# Patient Record
Sex: FEMALE | Race: WHITE | NOT HISPANIC OR LATINO | URBAN - METROPOLITAN AREA
[De-identification: names, ages, dates, MRNs, and addresses within clinical notes are randomized per-mention and may not be internally consistent; named-entity substitution may affect disease eponyms.]

---

## 2017-01-25 ENCOUNTER — OUTPATIENT (OUTPATIENT)
Dept: OUTPATIENT SERVICES | Facility: HOSPITAL | Age: 77
LOS: 1 days | End: 2017-01-25
Payer: MEDICARE

## 2017-01-25 VITALS
TEMPERATURE: 97 F | WEIGHT: 142.2 LBS | HEART RATE: 69 BPM | RESPIRATION RATE: 16 BRPM | SYSTOLIC BLOOD PRESSURE: 171 MMHG | HEIGHT: 62 IN | DIASTOLIC BLOOD PRESSURE: 91 MMHG

## 2017-01-25 DIAGNOSIS — Z01.818 ENCOUNTER FOR OTHER PREPROCEDURAL EXAMINATION: ICD-10-CM

## 2017-01-25 DIAGNOSIS — Z98.49 CATARACT EXTRACTION STATUS, UNSPECIFIED EYE: Chronic | ICD-10-CM

## 2017-01-25 DIAGNOSIS — Z98.890 OTHER SPECIFIED POSTPROCEDURAL STATES: Chronic | ICD-10-CM

## 2017-01-25 DIAGNOSIS — I10 ESSENTIAL (PRIMARY) HYPERTENSION: ICD-10-CM

## 2017-01-25 DIAGNOSIS — R93.8 ABNORMAL FINDINGS ON DIAGNOSTIC IMAGING OF OTHER SPECIFIED BODY STRUCTURES: ICD-10-CM

## 2017-01-25 LAB
ALBUMIN SERPL ELPH-MCNC: 4.3 G/DL — SIGNIFICANT CHANGE UP (ref 3.3–5.2)
ALP SERPL-CCNC: 51 U/L — SIGNIFICANT CHANGE UP (ref 40–120)
ALT FLD-CCNC: 21 U/L — SIGNIFICANT CHANGE UP
ANION GAP SERPL CALC-SCNC: 10 MMOL/L — SIGNIFICANT CHANGE UP (ref 5–17)
AST SERPL-CCNC: 23 U/L — SIGNIFICANT CHANGE UP
BASOPHILS # BLD AUTO: 0 K/UL — SIGNIFICANT CHANGE UP (ref 0–0.2)
BASOPHILS NFR BLD AUTO: 0.1 % — SIGNIFICANT CHANGE UP (ref 0–2)
BILIRUB SERPL-MCNC: 0.5 MG/DL — SIGNIFICANT CHANGE UP (ref 0.4–2)
BUN SERPL-MCNC: 18 MG/DL — SIGNIFICANT CHANGE UP (ref 8–20)
CALCIUM SERPL-MCNC: 10.1 MG/DL — SIGNIFICANT CHANGE UP (ref 8.6–10.2)
CHLORIDE SERPL-SCNC: 101 MMOL/L — SIGNIFICANT CHANGE UP (ref 98–107)
CO2 SERPL-SCNC: 28 MMOL/L — SIGNIFICANT CHANGE UP (ref 22–29)
CREAT SERPL-MCNC: 0.68 MG/DL — SIGNIFICANT CHANGE UP (ref 0.5–1.3)
EOSINOPHIL # BLD AUTO: 0.1 K/UL — SIGNIFICANT CHANGE UP (ref 0–0.5)
EOSINOPHIL NFR BLD AUTO: 0.8 % — SIGNIFICANT CHANGE UP (ref 0–6)
GLUCOSE SERPL-MCNC: 121 MG/DL — HIGH (ref 70–115)
HCT VFR BLD CALC: 40.7 % — SIGNIFICANT CHANGE UP (ref 37–47)
HGB BLD-MCNC: 13.5 G/DL — SIGNIFICANT CHANGE UP (ref 12–16)
LYMPHOCYTES # BLD AUTO: 1.7 K/UL — SIGNIFICANT CHANGE UP (ref 1–4.8)
LYMPHOCYTES # BLD AUTO: 19.2 % — LOW (ref 20–55)
MCHC RBC-ENTMCNC: 30.8 PG — SIGNIFICANT CHANGE UP (ref 27–31)
MCHC RBC-ENTMCNC: 33.2 G/DL — SIGNIFICANT CHANGE UP (ref 32–36)
MCV RBC AUTO: 92.9 FL — SIGNIFICANT CHANGE UP (ref 81–99)
MONOCYTES # BLD AUTO: 0.4 K/UL — SIGNIFICANT CHANGE UP (ref 0–0.8)
MONOCYTES NFR BLD AUTO: 5.1 % — SIGNIFICANT CHANGE UP (ref 3–10)
NEUTROPHILS # BLD AUTO: 6.5 K/UL — SIGNIFICANT CHANGE UP (ref 1.8–8)
NEUTROPHILS NFR BLD AUTO: 74.6 % — HIGH (ref 37–73)
PLATELET # BLD AUTO: 352 K/UL — SIGNIFICANT CHANGE UP (ref 150–400)
POTASSIUM SERPL-MCNC: 4.9 MMOL/L — SIGNIFICANT CHANGE UP (ref 3.5–5.3)
POTASSIUM SERPL-SCNC: 4.9 MMOL/L — SIGNIFICANT CHANGE UP (ref 3.5–5.3)
PROT SERPL-MCNC: 7.8 G/DL — SIGNIFICANT CHANGE UP (ref 6.6–8.7)
RBC # BLD: 4.38 M/UL — LOW (ref 4.4–5.2)
RBC # FLD: 13.8 % — SIGNIFICANT CHANGE UP (ref 11–15.6)
SODIUM SERPL-SCNC: 139 MMOL/L — SIGNIFICANT CHANGE UP (ref 135–145)
WBC # BLD: 8.66 K/UL — SIGNIFICANT CHANGE UP (ref 4.8–10.8)
WBC # FLD AUTO: 8.66 K/UL — SIGNIFICANT CHANGE UP (ref 4.8–10.8)

## 2017-01-25 PROCEDURE — 93010 ELECTROCARDIOGRAM REPORT: CPT

## 2017-01-25 PROCEDURE — 36415 COLL VENOUS BLD VENIPUNCTURE: CPT

## 2017-01-25 PROCEDURE — 71046 X-RAY EXAM CHEST 2 VIEWS: CPT

## 2017-01-25 PROCEDURE — 93005 ELECTROCARDIOGRAM TRACING: CPT

## 2017-01-25 PROCEDURE — 80053 COMPREHEN METABOLIC PANEL: CPT

## 2017-01-25 PROCEDURE — 71020: CPT | Mod: 26

## 2017-01-25 PROCEDURE — G0463: CPT

## 2017-01-25 PROCEDURE — 85027 COMPLETE CBC AUTOMATED: CPT

## 2017-01-25 RX ORDER — SODIUM CHLORIDE 9 MG/ML
3 INJECTION INTRAMUSCULAR; INTRAVENOUS; SUBCUTANEOUS ONCE
Qty: 0 | Refills: 0 | Status: DISCONTINUED | OUTPATIENT
Start: 2017-01-31 | End: 2017-02-15

## 2017-01-25 NOTE — H&P PST ADULT - PMH
Endometrial thickening on ultra sound    Hypertension Endometrial thickening on ultra sound    Hypertension    Varicose veins

## 2017-01-25 NOTE — H&P PST ADULT - EKG AND INTERPRETATION
Sinus rhythm with sinus arrhythmia  poss ant infarct age undetermined   pending final interpretation

## 2017-01-25 NOTE — H&P PST ADULT - PSH
H/O dilation and curettage    H/O lumpectomy    S/P cataract extraction    S/P sclerotherapy of varicose veins

## 2017-01-25 NOTE — H&P PST ADULT - ASSESSMENT
Pleasant 76 year old female with endometrial thickening is scheduled for surgery with DR. Richardson. D&C with hysteroscopy.

## 2017-01-25 NOTE — H&P PST ADULT - NSANTHOSAYNRD_GEN_A_CORE
No. SINAN screening performed.  STOP BANG Legend: 0-2 = LOW Risk; 3-4 = INTERMEDIATE Risk; 5-8 = HIGH Risk

## 2017-01-25 NOTE — H&P PST ADULT - HISTORY OF PRESENT ILLNESS
76 year old female presents for D&C with Dr. Richardson. Pt has pap every 2 years Oct 2016 pap normal , sono done and endometrial thickening noted. LMP >20 yrs ago , no spotting or c/o lower abdominal pain. .

## 2017-01-26 DIAGNOSIS — R93.8 ABNORMAL FINDINGS ON DIAGNOSTIC IMAGING OF OTHER SPECIFIED BODY STRUCTURES: ICD-10-CM

## 2017-01-26 DIAGNOSIS — Z01.818 ENCOUNTER FOR OTHER PREPROCEDURAL EXAMINATION: ICD-10-CM

## 2017-01-31 ENCOUNTER — OUTPATIENT (OUTPATIENT)
Dept: OUTPATIENT SERVICES | Facility: HOSPITAL | Age: 77
LOS: 1 days | End: 2017-01-31
Payer: MEDICARE

## 2017-01-31 VITALS
TEMPERATURE: 98 F | SYSTOLIC BLOOD PRESSURE: 147 MMHG | DIASTOLIC BLOOD PRESSURE: 81 MMHG | RESPIRATION RATE: 16 BRPM | WEIGHT: 142.2 LBS | HEIGHT: 62 IN | HEART RATE: 78 BPM | OXYGEN SATURATION: 99 %

## 2017-01-31 VITALS
HEART RATE: 72 BPM | OXYGEN SATURATION: 10 % | DIASTOLIC BLOOD PRESSURE: 60 MMHG | RESPIRATION RATE: 16 BRPM | SYSTOLIC BLOOD PRESSURE: 128 MMHG

## 2017-01-31 DIAGNOSIS — Z98.890 OTHER SPECIFIED POSTPROCEDURAL STATES: Chronic | ICD-10-CM

## 2017-01-31 DIAGNOSIS — Z01.818 ENCOUNTER FOR OTHER PREPROCEDURAL EXAMINATION: ICD-10-CM

## 2017-01-31 DIAGNOSIS — R93.8 ABNORMAL FINDINGS ON DIAGNOSTIC IMAGING OF OTHER SPECIFIED BODY STRUCTURES: ICD-10-CM

## 2017-01-31 DIAGNOSIS — Z98.49 CATARACT EXTRACTION STATUS, UNSPECIFIED EYE: Chronic | ICD-10-CM

## 2017-01-31 PROCEDURE — 58561 HYSTEROSCOPY REMOVE MYOMA: CPT

## 2017-01-31 PROCEDURE — 88305 TISSUE EXAM BY PATHOLOGIST: CPT | Mod: 26

## 2017-01-31 PROCEDURE — 88305 TISSUE EXAM BY PATHOLOGIST: CPT

## 2017-01-31 RX ORDER — MULTIVIT-MIN/FERROUS GLUCONATE 9 MG/15 ML
1 LIQUID (ML) ORAL
Qty: 0 | Refills: 0 | COMMUNITY

## 2017-01-31 RX ORDER — FENTANYL CITRATE 50 UG/ML
50 INJECTION INTRAVENOUS
Qty: 0 | Refills: 0 | Status: DISCONTINUED | OUTPATIENT
Start: 2017-01-31 | End: 2017-01-31

## 2017-01-31 RX ORDER — SODIUM CHLORIDE 9 MG/ML
1000 INJECTION, SOLUTION INTRAVENOUS
Qty: 0 | Refills: 0 | Status: DISCONTINUED | OUTPATIENT
Start: 2017-01-31 | End: 2017-01-31

## 2017-01-31 RX ORDER — UBIDECARENONE 100 MG
1 CAPSULE ORAL
Qty: 0 | Refills: 0 | COMMUNITY

## 2017-01-31 RX ORDER — ASCORBIC ACID 60 MG
0 TABLET,CHEWABLE ORAL
Qty: 0 | Refills: 0 | COMMUNITY

## 2017-01-31 RX ORDER — ONDANSETRON 8 MG/1
4 TABLET, FILM COATED ORAL ONCE
Qty: 0 | Refills: 0 | Status: DISCONTINUED | OUTPATIENT
Start: 2017-01-31 | End: 2017-01-31

## 2017-01-31 RX ORDER — OLMESARTAN MEDOXOMIL 5 MG/1
1 TABLET, FILM COATED ORAL
Qty: 0 | Refills: 0 | COMMUNITY

## 2017-01-31 RX ORDER — CHOLECALCIFEROL (VITAMIN D3) 125 MCG
1 CAPSULE ORAL
Qty: 0 | Refills: 0 | COMMUNITY

## 2017-01-31 NOTE — ASU DISCHARGE PLAN (ADULT/PEDIATRIC). - MEDICATION SUMMARY - MEDICATIONS TO TAKE
I will START or STAY ON the medications listed below when I get home from the hospital:    krill oil  --   once a day  -- Indication: For per PMD    vitamin b 12  --   once a day  -- Indication: For per PMD    magnesium  --   once a day  -- Indication: For per PMD    Benicar 40 mg oral tablet  -- 1 tab(s) by mouth once a day  -- Indication: For per PMD    Co Q-10 100 mg oral capsule  -- 1 cap(s) by mouth once a day  -- Indication: For per PMD    Calcium 600+D oral tablet  -- 1 tab(s) by mouth 2 times a day  -- Indication: For per PMD    Multi-Day Plus Minerals oral tablet  -- 1 tab(s) by mouth once a day  -- Indication: For per PMD    Vitamin D3 400 intl units oral capsule  -- 1 cap(s) by mouth once a day  -- Indication: For per PMD    Vitamin C 100 mg oral tablet  --  by mouth   -- Indication: For per PMD

## 2017-02-03 LAB — SURGICAL PATHOLOGY FINAL REPORT - CH: SIGNIFICANT CHANGE UP

## 2019-02-22 PROBLEM — R93.8 ABNORMAL FINDINGS ON DIAGNOSTIC IMAGING OF OTHER SPECIFIED BODY STRUCTURES: Chronic | Status: ACTIVE | Noted: 2017-01-25

## 2019-02-22 PROBLEM — I86.8 VARICOSE VEINS OF OTHER SPECIFIED SITES: Chronic | Status: ACTIVE | Noted: 2017-01-25

## 2019-02-22 PROBLEM — I10 ESSENTIAL (PRIMARY) HYPERTENSION: Chronic | Status: ACTIVE | Noted: 2017-01-25

## 2019-02-28 PROBLEM — Z00.00 ENCOUNTER FOR PREVENTIVE HEALTH EXAMINATION: Status: ACTIVE | Noted: 2019-02-28

## 2019-03-08 ENCOUNTER — APPOINTMENT (OUTPATIENT)
Dept: GYNECOLOGIC ONCOLOGY | Facility: CLINIC | Age: 79
End: 2019-03-08
Payer: MEDICARE

## 2019-03-08 DIAGNOSIS — Z80.0 FAMILY HISTORY OF MALIGNANT NEOPLASM OF DIGESTIVE ORGANS: ICD-10-CM

## 2019-03-08 DIAGNOSIS — E78.5 HYPERLIPIDEMIA, UNSPECIFIED: ICD-10-CM

## 2019-03-08 DIAGNOSIS — I10 ESSENTIAL (PRIMARY) HYPERTENSION: ICD-10-CM

## 2019-03-08 DIAGNOSIS — I34.1 NONRHEUMATIC MITRAL (VALVE) PROLAPSE: ICD-10-CM

## 2019-03-08 DIAGNOSIS — L98.9 DISORDER OF THE SKIN AND SUBCUTANEOUS TISSUE, UNSPECIFIED: ICD-10-CM

## 2019-03-08 DIAGNOSIS — K90.0 CELIAC DISEASE: ICD-10-CM

## 2019-03-08 PROCEDURE — 76857 US EXAM PELVIC LIMITED: CPT | Mod: 59

## 2019-03-08 PROCEDURE — 76830 TRANSVAGINAL US NON-OB: CPT | Mod: 59

## 2019-03-08 PROCEDURE — 99214 OFFICE O/P EST MOD 30 MIN: CPT | Mod: 25

## 2019-03-08 RX ORDER — CLOBETASOL PROPIONATE 0.5 MG/G
CREAM TOPICAL
Refills: 0 | Status: ACTIVE | COMMUNITY

## 2019-03-08 RX ORDER — OLMESARTAN MEDOXOMIL 40 MG/1
40 TABLET, FILM COATED ORAL
Refills: 0 | Status: ACTIVE | COMMUNITY

## 2019-03-08 RX ORDER — ROSUVASTATIN CALCIUM 5 MG/1
5 TABLET, FILM COATED ORAL
Refills: 0 | Status: ACTIVE | COMMUNITY

## 2019-03-08 NOTE — END OF VISIT
[FreeTextEntry3] : Written by Kristina TRAORE, acting as a scribe for Dr. Eliazar Richardson.\par This note accurately reflects the work and decisions made by me.\par

## 2019-03-08 NOTE — HISTORY OF PRESENT ILLNESS
[FreeTextEntry1] : This 79yo  LMP at 53 referred by Dr. Chaudhry for evaluation of a thickened lining. Patient had a routine sonogram in Dec 2018 which showed  AV uterus, 6.1mm well defined endometrium, ovaries not seen. Pt was seen here last year for the same reason and lining was normal. Denies vaginal bleeding, discharge or pelvic pain. \par \par Pap smear-Dec 2018-denies abn paps \par Mammogram-2018\par Colonoscopy-\par Bone density-2018

## 2019-03-08 NOTE — ASSESSMENT
[FreeTextEntry1] : Discussed with patient that on sonogram today, her endometrial lining is thin with a trace of fluid. Reassured patient that I am not concerned of a malignancy especially since she has no symptoms of bleeding or pain. Advised pt to return here in 6 months for another pelvic sonogram to evaluate lining. We discussed her lichen sclerosus which pt is concerned about. She is concerned about using clobetasol long term. I reassured patient that during flare ups she should apply it twice daily for 2 weeks and then apply sparingly. Pt agreed to comply.

## 2019-03-08 NOTE — PHYSICAL EXAM
[Normal] : Anus and perineum: Normal sphincter tone, no masses, no prolapse. [de-identified] : Patient was interviewed and examined with chaperone present. Name of chaperone: Kristina Driver Patient was interviewed and examined with chaperone present. Name of chaperone: Kristina Driver

## 2019-03-08 NOTE — CHIEF COMPLAINT
[FreeTextEntry1] : [facility]\par \par [facility] [phone] at [address] [place] [id number]\par

## 2019-09-11 ENCOUNTER — APPOINTMENT (OUTPATIENT)
Dept: GYNECOLOGIC ONCOLOGY | Facility: CLINIC | Age: 79
End: 2019-09-11

## 2020-12-28 ENCOUNTER — NON-APPOINTMENT (OUTPATIENT)
Age: 80
End: 2020-12-28

## 2021-01-13 ENCOUNTER — NON-APPOINTMENT (OUTPATIENT)
Age: 81
End: 2021-01-13

## 2021-01-13 ENCOUNTER — APPOINTMENT (OUTPATIENT)
Dept: GYNECOLOGIC ONCOLOGY | Facility: CLINIC | Age: 81
End: 2021-01-13
Payer: MEDICARE

## 2021-01-13 VITALS — BODY MASS INDEX: 26.62 KG/M2 | HEIGHT: 61 IN | WEIGHT: 141 LBS

## 2021-01-13 DIAGNOSIS — N88.2 STRICTURE AND STENOSIS OF CERVIX UTERI: ICD-10-CM

## 2021-01-13 DIAGNOSIS — N90.4 LEUKOPLAKIA OF VULVA: ICD-10-CM

## 2021-01-13 PROCEDURE — 76857 US EXAM PELVIC LIMITED: CPT | Mod: 59

## 2021-01-13 PROCEDURE — 76830 TRANSVAGINAL US NON-OB: CPT | Mod: 59

## 2021-01-13 PROCEDURE — 58100 BIOPSY OF UTERUS LINING: CPT | Mod: 52

## 2021-01-13 PROCEDURE — 99215 OFFICE O/P EST HI 40 MIN: CPT | Mod: 25

## 2021-01-13 RX ORDER — MISOPROSTOL 100 UG/1
100 TABLET ORAL
Qty: 2 | Refills: 0 | Status: ACTIVE | COMMUNITY
Start: 2021-01-13 | End: 1900-01-01

## 2021-01-13 NOTE — CHIEF COMPLAINT
[FreeTextEntry1] : [facility]\par \par [facility] [phone] at [address] [place] [id number]\par

## 2021-01-13 NOTE — HISTORY OF PRESENT ILLNESS
[FreeTextEntry1] : This 79yo  LMP at 53 last seen here in 2019 for a thickened endometrial lining and lichens sclerosus returns at the request of Isidoro for follow up of lichens and evaluation of a endometrial lining. Her pelvic US in my office on 3/8/19 revealed a normal lining with trace FF and she was discharged back to Dr. Chaudhry at that time. Currently denies vaginal bleeding, discharge or pelvic pain. Pt uses Clobetasol cream regularly and for flare ups uses it twice daily. She feels it is well controlled. \par \par Pap [id number] paps \par Mammogram-2021-reports wnl \par Colonoscopy-2017\par Bone density-2021 \par  \par

## 2021-01-13 NOTE — PHYSICAL EXAM
[Abnormal] : External genitalia: Abnormal [Normal] : Bimanual Exam: Normal [de-identified] : hypopigmented anterior and posterior vulva with no signs of worrisome disease  [de-identified] : Patient was interviewed and examined with chaperone present. Name of chaperone: Kristina Driver

## 2021-01-13 NOTE — ASSESSMENT
[FreeTextEntry1] : 81yo female with well controlled lichen sclerosus and thickened endometrial lining (11.8mm on pelvic US today). Pt is asymptomatic with no VB or pelvic pain. Unable to perform endometrial biopsy due stenotic cervix. Recommendation is for D&C, [facility] \par \par I discussed at length with the patient the nature, purpose, risks, benefits, and alternatives to dilation and curettage, hysteroscopy and possible myosure. She understands the risks to include (but not be limited to): uterine perforation with possible need for laparoscopy and/or laparotomy; infection with need for hospitalization; fluid overload with possible critical illness as a consequence; and bleeding with need for transfusion.  The patient agrees to proceed.\par

## 2021-01-15 ENCOUNTER — TRANSCRIPTION ENCOUNTER (OUTPATIENT)
Age: 81
End: 2021-01-15

## 2021-01-28 DIAGNOSIS — Z01.818 ENCOUNTER FOR OTHER PREPROCEDURAL EXAMINATION: ICD-10-CM

## 2021-02-03 ENCOUNTER — APPOINTMENT (OUTPATIENT)
Dept: DISASTER EMERGENCY | Facility: CLINIC | Age: 81
End: 2021-02-03

## 2021-02-05 ENCOUNTER — OUTPATIENT (OUTPATIENT)
Dept: OUTPATIENT SERVICES | Facility: HOSPITAL | Age: 81
LOS: 1 days | End: 2021-02-05
Payer: MEDICARE

## 2021-02-05 ENCOUNTER — RESULT REVIEW (OUTPATIENT)
Age: 81
End: 2021-02-05

## 2021-02-05 DIAGNOSIS — Z98.890 OTHER SPECIFIED POSTPROCEDURAL STATES: Chronic | ICD-10-CM

## 2021-02-05 DIAGNOSIS — Z01.818 ENCOUNTER FOR OTHER PREPROCEDURAL EXAMINATION: ICD-10-CM

## 2021-02-05 DIAGNOSIS — Z98.49 CATARACT EXTRACTION STATUS, UNSPECIFIED EYE: Chronic | ICD-10-CM

## 2021-02-05 LAB
ANION GAP SERPL CALC-SCNC: 12 MMOL/L — SIGNIFICANT CHANGE UP (ref 5–17)
BASOPHILS # BLD AUTO: 0.04 K/UL — SIGNIFICANT CHANGE UP (ref 0–0.2)
BASOPHILS NFR BLD AUTO: 0.5 % — SIGNIFICANT CHANGE UP (ref 0–2)
BUN SERPL-MCNC: 13 MG/DL — SIGNIFICANT CHANGE UP (ref 8–20)
CALCIUM SERPL-MCNC: 9.5 MG/DL — SIGNIFICANT CHANGE UP (ref 8.6–10.2)
CHLORIDE SERPL-SCNC: 104 MMOL/L — SIGNIFICANT CHANGE UP (ref 98–107)
CO2 SERPL-SCNC: 25 MMOL/L — SIGNIFICANT CHANGE UP (ref 22–29)
CREAT SERPL-MCNC: 0.63 MG/DL — SIGNIFICANT CHANGE UP (ref 0.5–1.3)
EOSINOPHIL # BLD AUTO: 0.19 K/UL — SIGNIFICANT CHANGE UP (ref 0–0.5)
EOSINOPHIL NFR BLD AUTO: 2.4 % — SIGNIFICANT CHANGE UP (ref 0–6)
GLUCOSE SERPL-MCNC: 138 MG/DL — HIGH (ref 70–99)
HCT VFR BLD CALC: 42.6 % — SIGNIFICANT CHANGE UP (ref 34.5–45)
HGB BLD-MCNC: 13.8 G/DL — SIGNIFICANT CHANGE UP (ref 11.5–15.5)
IMM GRANULOCYTES NFR BLD AUTO: 0.1 % — SIGNIFICANT CHANGE UP (ref 0–1.5)
LYMPHOCYTES # BLD AUTO: 2.14 K/UL — SIGNIFICANT CHANGE UP (ref 1–3.3)
LYMPHOCYTES # BLD AUTO: 27.2 % — SIGNIFICANT CHANGE UP (ref 13–44)
MCHC RBC-ENTMCNC: 31.1 PG — SIGNIFICANT CHANGE UP (ref 27–34)
MCHC RBC-ENTMCNC: 32.4 GM/DL — SIGNIFICANT CHANGE UP (ref 32–36)
MCV RBC AUTO: 95.9 FL — SIGNIFICANT CHANGE UP (ref 80–100)
MONOCYTES # BLD AUTO: 0.59 K/UL — SIGNIFICANT CHANGE UP (ref 0–0.9)
MONOCYTES NFR BLD AUTO: 7.5 % — SIGNIFICANT CHANGE UP (ref 2–14)
NEUTROPHILS # BLD AUTO: 4.89 K/UL — SIGNIFICANT CHANGE UP (ref 1.8–7.4)
NEUTROPHILS NFR BLD AUTO: 62.3 % — SIGNIFICANT CHANGE UP (ref 43–77)
PLATELET # BLD AUTO: 339 K/UL — SIGNIFICANT CHANGE UP (ref 150–400)
POTASSIUM SERPL-MCNC: 4.1 MMOL/L — SIGNIFICANT CHANGE UP (ref 3.5–5.3)
POTASSIUM SERPL-SCNC: 4.1 MMOL/L — SIGNIFICANT CHANGE UP (ref 3.5–5.3)
RBC # BLD: 4.44 M/UL — SIGNIFICANT CHANGE UP (ref 3.8–5.2)
RBC # FLD: 13 % — SIGNIFICANT CHANGE UP (ref 10.3–14.5)
SODIUM SERPL-SCNC: 141 MMOL/L — SIGNIFICANT CHANGE UP (ref 135–145)
WBC # BLD: 7.86 K/UL — SIGNIFICANT CHANGE UP (ref 3.8–10.5)
WBC # FLD AUTO: 7.86 K/UL — SIGNIFICANT CHANGE UP (ref 3.8–10.5)

## 2021-02-05 PROCEDURE — 71046 X-RAY EXAM CHEST 2 VIEWS: CPT

## 2021-02-05 PROCEDURE — 36415 COLL VENOUS BLD VENIPUNCTURE: CPT

## 2021-02-05 PROCEDURE — G0463: CPT

## 2021-02-05 PROCEDURE — 71046 X-RAY EXAM CHEST 2 VIEWS: CPT | Mod: 26

## 2021-02-05 PROCEDURE — 85025 COMPLETE CBC W/AUTO DIFF WBC: CPT

## 2021-02-05 PROCEDURE — 80048 BASIC METABOLIC PNL TOTAL CA: CPT

## 2021-02-17 ENCOUNTER — APPOINTMENT (OUTPATIENT)
Dept: DISASTER EMERGENCY | Facility: CLINIC | Age: 81
End: 2021-02-17

## 2021-02-18 LAB — SARS-COV-2 N GENE NPH QL NAA+PROBE: NOT DETECTED

## 2021-02-21 ENCOUNTER — FORM ENCOUNTER (OUTPATIENT)
Age: 81
End: 2021-02-21

## 2021-02-22 ENCOUNTER — RESULT REVIEW (OUTPATIENT)
Age: 81
End: 2021-02-22

## 2021-03-03 PROBLEM — R93.89 THICKENED ENDOMETRIUM: Status: ACTIVE | Noted: 2019-03-08

## 2021-03-05 ENCOUNTER — APPOINTMENT (OUTPATIENT)
Dept: GYNECOLOGIC ONCOLOGY | Facility: CLINIC | Age: 81
End: 2021-03-05
Payer: MEDICARE

## 2021-03-05 DIAGNOSIS — R93.89 ABNORMAL FINDINGS ON DIAGNOSTIC IMAGING OF OTHER SPECIFIED BODY STRUCTURES: ICD-10-CM

## 2021-03-05 PROCEDURE — 99213 OFFICE O/P EST LOW 20 MIN: CPT

## 2021-03-09 NOTE — END OF VISIT
[FreeTextEntry3] : Written by Kayla Velez, acting as a scribe for Dr. Eliazar Richardson \par This note accurately reflects the work and decisions made by me.

## 2021-03-09 NOTE — DISCUSSION/SUMMARY
[Doing Well] : is doing well [Excellent Pain Control] : has excellent pain control [No Sign of Infection] : is showing no signs of infection [Findings] : These findings were discussed with [unfilled] in detail. She understood and accepted the rationale for this recommendation and also understood the serious impact that these findings could have upon her prognosis for survival. [Cervical Abnormality] : normal cervix [External Genitalia Abnormal] : normal external genitalia [Vaginal Exam Abnormal] : normal vaginal exam [de-identified] : Kayla Velez Medical assistant chaperoned during gynecologic exam.  [FreeTextEntry1] : Pertinent findings revealed normal external female genitalia, BUS WNL, normal vagina and parametria. Small cervix with friable endocervical tissue so ECC was done. Small anteverted uterus without adnexal masses. Normal endometrium on hysteroscopy. \par \par Final pathology revealed benign pathology

## 2021-03-09 NOTE — REASON FOR VISIT
[Post Op] : post op visit [de-identified] : 2/22/21 [de-identified] : EUA, Fractional Dilation and Curettage, Diagnostic hysteroscopy at [facility] for thickened endometrium on ultrasound.

## 2021-12-10 ENCOUNTER — APPOINTMENT (OUTPATIENT)
Dept: GYNECOLOGIC ONCOLOGY | Facility: CLINIC | Age: 81
End: 2021-12-10
Payer: MEDICARE

## 2021-12-10 PROCEDURE — 76857 US EXAM PELVIC LIMITED: CPT | Mod: 59

## 2021-12-10 PROCEDURE — 76830 TRANSVAGINAL US NON-OB: CPT | Mod: 59

## 2021-12-10 PROCEDURE — 99213 OFFICE O/P EST LOW 20 MIN: CPT | Mod: 25

## 2021-12-23 NOTE — REASON FOR VISIT
[FreeTextEntry1] : [facility] \par \par [facility]. [phone]\par [address] [place] [address]

## 2021-12-23 NOTE — PHYSICAL EXAM
[Normal] : Mood and affect: Normal [FreeTextEntry1] : Kayla Velez Medical assistant chaperoned during results and discussion

## 2021-12-23 NOTE — HISTORY OF PRESENT ILLNESS
[FreeTextEntry1] : This 82 y/o is s/p EUA, Fractional dilation and curettage, diagnostic hysteroscopy on 2/22/21 for thickened endometrium. Final pathology revealed benign endometrial polypoid tissue. Patient was discharged from my practice back to routine gynecological care. She returns to the office today for VB. \par \par Patient reports having some staining a few days which has now resolved.

## 2022-01-05 NOTE — ASU PREOP CHECKLIST - HAND OFF
From: Rustam Navarrete  To: Otis Dalton  Sent: 1/5/2022 2:39 PM CST  Subject: Endocrinologist     Hi Dr Dalton,  I got an appointment with John Solo for Monday April 25, 2022 at 3:20pm.  So, thankfully not June. Let me know if you’d like it to be sooner. I’m open to that.   In health,  Rustam    yes

## 2022-03-16 NOTE — END OF VISIT
[FreeTextEntry3] : Written by Kayla Velez, acting as a scribe for Dr. Eliazar Richardson \par This note accurately reflects the work and decisions made by me. 
no

## 2023-03-23 ENCOUNTER — OFFICE (OUTPATIENT)
Dept: URBAN - METROPOLITAN AREA CLINIC 104 | Facility: CLINIC | Age: 83
Setting detail: OPHTHALMOLOGY
End: 2023-03-23
Payer: MEDICARE

## 2023-03-23 DIAGNOSIS — H35.373: ICD-10-CM

## 2023-03-23 DIAGNOSIS — H16.223: ICD-10-CM

## 2023-03-23 DIAGNOSIS — D31.32: ICD-10-CM

## 2023-03-23 DIAGNOSIS — H01.004: ICD-10-CM

## 2023-03-23 DIAGNOSIS — H01.002: ICD-10-CM

## 2023-03-23 DIAGNOSIS — H26.493: ICD-10-CM

## 2023-03-23 DIAGNOSIS — H43.813: ICD-10-CM

## 2023-03-23 DIAGNOSIS — H01.005: ICD-10-CM

## 2023-03-23 DIAGNOSIS — H01.001: ICD-10-CM

## 2023-03-23 PROCEDURE — 92134 CPTRZ OPH DX IMG PST SGM RTA: CPT | Performed by: SPECIALIST

## 2023-03-23 PROCEDURE — 92014 COMPRE OPH EXAM EST PT 1/>: CPT | Performed by: SPECIALIST

## 2023-03-23 ASSESSMENT — TONOMETRY
OD_IOP_MMHG: 16
OS_IOP_MMHG: 16

## 2023-03-23 ASSESSMENT — LID EXAM ASSESSMENTS
OS_BLEPHARITIS: LLL LUL 3+
OD_BLEPHARITIS: RLL RUL 3+

## 2023-03-23 ASSESSMENT — TEAR BREAK UP TIME (TBUT)
OS_TBUT: 2+
OD_TBUT: 2+

## 2023-03-23 ASSESSMENT — KERATOMETRY
OS_K2POWER_DIOPTERS: 44.64
OD_AXISANGLE_DEGREES: 014
OS_AXISANGLE_DEGREES: 034
OD_K2POWER_DIOPTERS: 44.47
OS_K1POWER_DIOPTERS: 43.89
OD_K1POWER_DIOPTERS: 43.49

## 2023-03-23 ASSESSMENT — VISUAL ACUITY
OS_BCVA: 20/25+1
OD_BCVA: 20/25+2

## 2023-03-23 ASSESSMENT — REFRACTION_AUTOREFRACTION
OS_CYLINDER: -0.75
OS_SPHERE: PL
OD_SPHERE: +1.25
OD_AXIS: 120
OD_CYLINDER: -1.75
OS_AXIS: 104

## 2023-03-23 ASSESSMENT — CONFRONTATIONAL VISUAL FIELD TEST (CVF)
OS_FINDINGS: FULL
OD_FINDINGS: FULL

## 2023-03-23 ASSESSMENT — SPHEQUIV_DERIVED: OD_SPHEQUIV: 0.375

## 2023-03-23 ASSESSMENT — AXIALLENGTH_DERIVED: OD_AL: 23.27

## 2023-03-23 ASSESSMENT — SUPERFICIAL PUNCTATE KERATITIS (SPK): OD_SPK: T

## 2023-03-23 ASSESSMENT — DRY EYES - PHYSICIAN NOTES: OD_GENERALCOMMENTS: INFERIOR

## 2024-03-28 ENCOUNTER — OFFICE (OUTPATIENT)
Dept: URBAN - METROPOLITAN AREA CLINIC 104 | Facility: CLINIC | Age: 84
Setting detail: OPHTHALMOLOGY
End: 2024-03-28
Payer: MEDICARE

## 2024-03-28 DIAGNOSIS — H01.002: ICD-10-CM

## 2024-03-28 DIAGNOSIS — H01.004: ICD-10-CM

## 2024-03-28 DIAGNOSIS — H35.373: ICD-10-CM

## 2024-03-28 DIAGNOSIS — H26.493: ICD-10-CM

## 2024-03-28 DIAGNOSIS — D31.32: ICD-10-CM

## 2024-03-28 DIAGNOSIS — H01.005: ICD-10-CM

## 2024-03-28 DIAGNOSIS — H43.813: ICD-10-CM

## 2024-03-28 DIAGNOSIS — H01.001: ICD-10-CM

## 2024-03-28 DIAGNOSIS — H16.223: ICD-10-CM

## 2024-03-28 PROCEDURE — 92134 CPTRZ OPH DX IMG PST SGM RTA: CPT | Performed by: SPECIALIST

## 2024-03-28 PROCEDURE — 92014 COMPRE OPH EXAM EST PT 1/>: CPT | Performed by: SPECIALIST

## 2024-03-28 ASSESSMENT — LID EXAM ASSESSMENTS
OS_BLEPHARITIS: LLL LUL 3+
OD_BLEPHARITIS: RLL RUL 3+

## 2025-01-11 ENCOUNTER — OFFICE (OUTPATIENT)
Dept: URBAN - METROPOLITAN AREA CLINIC 104 | Facility: CLINIC | Age: 85
Setting detail: OPHTHALMOLOGY
End: 2025-01-11
Payer: MEDICARE

## 2025-01-11 DIAGNOSIS — H35.373: ICD-10-CM

## 2025-01-11 DIAGNOSIS — H01.001: ICD-10-CM

## 2025-01-11 DIAGNOSIS — H01.005: ICD-10-CM

## 2025-01-11 DIAGNOSIS — H01.004: ICD-10-CM

## 2025-01-11 DIAGNOSIS — H43.813: ICD-10-CM

## 2025-01-11 DIAGNOSIS — H01.002: ICD-10-CM

## 2025-01-11 PROBLEM — Z96.1 PSEUDOPHAKIA: BOTH EYES: Status: ACTIVE | Noted: 2025-01-11

## 2025-01-11 PROBLEM — D31.32 CHOROIDAL NEVUS, LEFT EYE: Status: ACTIVE | Noted: 2025-01-11

## 2025-01-11 PROCEDURE — 92250 FUNDUS PHOTOGRAPHY W/I&R: CPT | Performed by: OPTOMETRIST

## 2025-01-11 PROCEDURE — 99213 OFFICE O/P EST LOW 20 MIN: CPT | Performed by: OPTOMETRIST

## 2025-01-11 ASSESSMENT — LID EXAM ASSESSMENTS
OD_BLEPHARITIS: RLL RUL 2+
OS_BLEPHARITIS: LLL LUL 2+

## 2025-01-11 ASSESSMENT — REFRACTION_AUTOREFRACTION
OD_CYLINDER: -1.75
OD_AXIS: 103
OS_CYLINDER: -0.25
OD_SPHERE: +1.00
OS_AXIS: 092
OS_SPHERE: +0.25

## 2025-01-11 ASSESSMENT — KERATOMETRY
OD_K2POWER_DIOPTERS: 44.47
OD_AXISANGLE_DEGREES: 012
OS_AXISANGLE_DEGREES: 028
OD_K1POWER_DIOPTERS: 43.95
OS_K2POWER_DIOPTERS: 44.53
OS_K1POWER_DIOPTERS: 43.95

## 2025-01-11 ASSESSMENT — VISUAL ACUITY
OS_BCVA: 20/30-2
OD_BCVA: 20/30-1

## 2025-01-11 ASSESSMENT — SUPERFICIAL PUNCTATE KERATITIS (SPK)
OS_SPK: 2+ 3+
OD_SPK: 2+ 3+

## 2025-01-11 ASSESSMENT — CONFRONTATIONAL VISUAL FIELD TEST (CVF)
OS_FINDINGS: FULL
OD_FINDINGS: FULL

## 2025-01-23 NOTE — ASU PATIENT PROFILE, ADULT - PATIENT REPRESENTATIVE PHONE
Reviewed the patient's current social situation with her mother and father both in nursing home care.  Significant stress levels due to their ongoing medical problems.          [phone]

## 2025-04-03 ENCOUNTER — OFFICE (OUTPATIENT)
Dept: URBAN - METROPOLITAN AREA CLINIC 104 | Facility: CLINIC | Age: 85
Setting detail: OPHTHALMOLOGY
End: 2025-04-03
Payer: MEDICARE

## 2025-04-03 DIAGNOSIS — H01.001: ICD-10-CM

## 2025-04-03 DIAGNOSIS — H01.005: ICD-10-CM

## 2025-04-03 DIAGNOSIS — Z96.1: ICD-10-CM

## 2025-04-03 DIAGNOSIS — H01.002: ICD-10-CM

## 2025-04-03 DIAGNOSIS — H35.373: ICD-10-CM

## 2025-04-03 DIAGNOSIS — H01.004: ICD-10-CM

## 2025-04-03 DIAGNOSIS — H16.223: ICD-10-CM

## 2025-04-03 DIAGNOSIS — D31.32: ICD-10-CM

## 2025-04-03 DIAGNOSIS — H43.813: ICD-10-CM

## 2025-04-03 PROCEDURE — 92134 CPTRZ OPH DX IMG PST SGM RTA: CPT | Performed by: SPECIALIST

## 2025-04-03 PROCEDURE — 92014 COMPRE OPH EXAM EST PT 1/>: CPT | Performed by: SPECIALIST

## 2025-04-03 ASSESSMENT — KERATOMETRY
OS_K1POWER_DIOPTERS: 43.95
OD_K2POWER_DIOPTERS: 44.47
OS_AXISANGLE_DEGREES: 028
OD_AXISANGLE_DEGREES: 012
OD_K1POWER_DIOPTERS: 43.95
OS_K2POWER_DIOPTERS: 44.53

## 2025-04-03 ASSESSMENT — REFRACTION_AUTOREFRACTION
OS_AXIS: 106
OS_SPHERE: 0.00
OD_AXIS: 101
OD_SPHERE: +0.75
OS_CYLINDER: -0.50
OD_CYLINDER: -1.25

## 2025-04-03 ASSESSMENT — TEAR BREAK UP TIME (TBUT)
OD_TBUT: 1+
OS_TBUT: 1+

## 2025-04-03 ASSESSMENT — CONFRONTATIONAL VISUAL FIELD TEST (CVF)
OD_FINDINGS: FULL
OS_FINDINGS: FULL

## 2025-04-03 ASSESSMENT — TONOMETRY
OD_IOP_MMHG: 17
OS_IOP_MMHG: 17

## 2025-04-03 ASSESSMENT — LID EXAM ASSESSMENTS
OD_BLEPHARITIS: RLL RUL 2+
OS_BLEPHARITIS: LLL LUL 2+

## 2025-04-03 ASSESSMENT — VISUAL ACUITY
OD_BCVA: 20/30-1
OS_BCVA: 20/30-2